# Patient Record
Sex: MALE | Race: WHITE
[De-identification: names, ages, dates, MRNs, and addresses within clinical notes are randomized per-mention and may not be internally consistent; named-entity substitution may affect disease eponyms.]

---

## 2023-08-06 ENCOUNTER — HOSPITAL ENCOUNTER (INPATIENT)
Dept: HOSPITAL 95 - PCU | Age: 88
LOS: 13 days | Discharge: HOME HEALTH | DRG: 291 | End: 2023-08-19
Attending: INTERNAL MEDICINE | Admitting: HOSPITALIST
Payer: MEDICARE

## 2023-08-06 VITALS — DIASTOLIC BLOOD PRESSURE: 53 MMHG | SYSTOLIC BLOOD PRESSURE: 109 MMHG

## 2023-08-06 VITALS — DIASTOLIC BLOOD PRESSURE: 49 MMHG | SYSTOLIC BLOOD PRESSURE: 111 MMHG

## 2023-08-06 VITALS — SYSTOLIC BLOOD PRESSURE: 117 MMHG | DIASTOLIC BLOOD PRESSURE: 81 MMHG

## 2023-08-06 VITALS — WEIGHT: 174.12 LBS | HEIGHT: 71 IN | BODY MASS INDEX: 24.38 KG/M2

## 2023-08-06 VITALS — DIASTOLIC BLOOD PRESSURE: 55 MMHG | SYSTOLIC BLOOD PRESSURE: 127 MMHG

## 2023-08-06 DIAGNOSIS — I50.33: ICD-10-CM

## 2023-08-06 DIAGNOSIS — J44.0: ICD-10-CM

## 2023-08-06 DIAGNOSIS — J96.02: ICD-10-CM

## 2023-08-06 DIAGNOSIS — N20.0: ICD-10-CM

## 2023-08-06 DIAGNOSIS — E11.9: ICD-10-CM

## 2023-08-06 DIAGNOSIS — I11.0: Primary | ICD-10-CM

## 2023-08-06 DIAGNOSIS — Z66: ICD-10-CM

## 2023-08-06 DIAGNOSIS — Z86.711: ICD-10-CM

## 2023-08-06 DIAGNOSIS — I87.2: ICD-10-CM

## 2023-08-06 DIAGNOSIS — D63.8: ICD-10-CM

## 2023-08-06 DIAGNOSIS — L89.152: ICD-10-CM

## 2023-08-06 DIAGNOSIS — E87.29: ICD-10-CM

## 2023-08-06 DIAGNOSIS — E87.0: ICD-10-CM

## 2023-08-06 DIAGNOSIS — N21.0: ICD-10-CM

## 2023-08-06 DIAGNOSIS — J90: ICD-10-CM

## 2023-08-06 DIAGNOSIS — J96.01: ICD-10-CM

## 2023-08-06 DIAGNOSIS — R34: ICD-10-CM

## 2023-08-06 DIAGNOSIS — I95.9: ICD-10-CM

## 2023-08-06 DIAGNOSIS — Z51.5: ICD-10-CM

## 2023-08-06 DIAGNOSIS — N26.1: ICD-10-CM

## 2023-08-06 DIAGNOSIS — Z89.512: ICD-10-CM

## 2023-08-06 DIAGNOSIS — I48.0: ICD-10-CM

## 2023-08-06 DIAGNOSIS — J98.11: ICD-10-CM

## 2023-08-06 LAB — PH BLDV: 7.4 [PH] (ref 7.34–7.37)

## 2023-08-06 PROCEDURE — A9270 NON-COVERED ITEM OR SERVICE: HCPCS

## 2023-08-06 PROCEDURE — P9016 RBC LEUKOCYTES REDUCED: HCPCS

## 2023-08-07 VITALS — SYSTOLIC BLOOD PRESSURE: 97 MMHG | DIASTOLIC BLOOD PRESSURE: 51 MMHG

## 2023-08-07 VITALS — DIASTOLIC BLOOD PRESSURE: 56 MMHG | SYSTOLIC BLOOD PRESSURE: 96 MMHG

## 2023-08-07 VITALS — SYSTOLIC BLOOD PRESSURE: 114 MMHG | DIASTOLIC BLOOD PRESSURE: 57 MMHG

## 2023-08-07 VITALS — DIASTOLIC BLOOD PRESSURE: 49 MMHG | SYSTOLIC BLOOD PRESSURE: 102 MMHG

## 2023-08-07 VITALS — SYSTOLIC BLOOD PRESSURE: 112 MMHG | DIASTOLIC BLOOD PRESSURE: 54 MMHG

## 2023-08-07 VITALS — SYSTOLIC BLOOD PRESSURE: 95 MMHG | DIASTOLIC BLOOD PRESSURE: 74 MMHG

## 2023-08-07 VITALS — DIASTOLIC BLOOD PRESSURE: 57 MMHG | SYSTOLIC BLOOD PRESSURE: 98 MMHG

## 2023-08-07 VITALS — DIASTOLIC BLOOD PRESSURE: 54 MMHG | SYSTOLIC BLOOD PRESSURE: 102 MMHG

## 2023-08-07 VITALS — SYSTOLIC BLOOD PRESSURE: 100 MMHG | DIASTOLIC BLOOD PRESSURE: 51 MMHG

## 2023-08-07 VITALS — DIASTOLIC BLOOD PRESSURE: 49 MMHG | SYSTOLIC BLOOD PRESSURE: 94 MMHG

## 2023-08-07 VITALS — DIASTOLIC BLOOD PRESSURE: 53 MMHG | SYSTOLIC BLOOD PRESSURE: 88 MMHG

## 2023-08-07 VITALS — SYSTOLIC BLOOD PRESSURE: 115 MMHG | DIASTOLIC BLOOD PRESSURE: 61 MMHG

## 2023-08-07 VITALS — SYSTOLIC BLOOD PRESSURE: 99 MMHG | DIASTOLIC BLOOD PRESSURE: 56 MMHG

## 2023-08-07 VITALS — SYSTOLIC BLOOD PRESSURE: 85 MMHG | DIASTOLIC BLOOD PRESSURE: 50 MMHG

## 2023-08-07 VITALS — DIASTOLIC BLOOD PRESSURE: 49 MMHG | SYSTOLIC BLOOD PRESSURE: 79 MMHG

## 2023-08-07 VITALS — SYSTOLIC BLOOD PRESSURE: 90 MMHG | DIASTOLIC BLOOD PRESSURE: 48 MMHG

## 2023-08-07 LAB
ANION GAP SERPL CALCULATED.4IONS-SCNC: 3 MMOL/L (ref 6–16)
BASOPHILS # BLD AUTO: 0.04 K/MM3 (ref 0–0.23)
BASOPHILS NFR BLD AUTO: 1 % (ref 0–2)
BUN SERPL-MCNC: 20 MG/DL (ref 8–24)
CALCIUM SERPL-MCNC: 8.6 MG/DL (ref 8.5–10.1)
CHLORIDE SERPL-SCNC: 105 MMOL/L (ref 98–108)
CO2 SERPL-SCNC: 37 MMOL/L (ref 21–32)
CREAT SERPL-MCNC: 1.05 MG/DL (ref 0.6–1.2)
DEPRECATED RDW RBC AUTO: 53.3 FL (ref 35.1–46.3)
EOSINOPHIL # BLD AUTO: 0.03 K/MM3 (ref 0–0.68)
EOSINOPHIL NFR BLD AUTO: 1 % (ref 0–6)
ERYTHROCYTE [DISTWIDTH] IN BLOOD BY AUTOMATED COUNT: 17.3 % (ref 11.7–14.2)
GLUCOSE SERPL-MCNC: 210 MG/DL (ref 70–99)
GLUCOSE SERPL-MCNC: 81 MG/DL (ref 70–99)
GLUCOSE SERPL-MCNC: 95 MG/DL (ref 70–99)
HCT VFR BLD AUTO: 29.5 % (ref 37–53)
HGB BLD-MCNC: 8.3 G/DL (ref 13.5–17.5)
IMM GRANULOCYTES # BLD AUTO: 0.05 K/MM3 (ref 0–0.1)
IMM GRANULOCYTES NFR BLD AUTO: 1 % (ref 0–1)
LYMPHOCYTES # BLD AUTO: 0.95 K/MM3 (ref 0.84–5.2)
LYMPHOCYTES NFR BLD AUTO: 20 % (ref 21–46)
MCHC RBC AUTO-ENTMCNC: 28.1 G/DL (ref 31.5–36.5)
MCV RBC AUTO: 85 FL (ref 80–100)
MONOCYTES # BLD AUTO: 0.38 K/MM3 (ref 0.16–1.47)
MONOCYTES NFR BLD AUTO: 8 % (ref 4–13)
NEUTROPHILS # BLD AUTO: 3.2 K/MM3 (ref 1.96–9.15)
NEUTROPHILS NFR BLD AUTO: 69 % (ref 41–73)
NRBC # BLD AUTO: 0 K/MM3 (ref 0–0.02)
NRBC BLD AUTO-RTO: 0 /100 WBC (ref 0–0.2)
PH BLDV: 7.42 [PH] (ref 7.34–7.37)
PLATELET # BLD AUTO: 177 K/MM3 (ref 150–400)
POTASSIUM SERPL-SCNC: 4.3 MMOL/L (ref 3.5–5.5)
PROTHROMBIN TIME: 12.4 SEC (ref 9.7–11.5)
SODIUM SERPL-SCNC: 145 MMOL/L (ref 136–145)

## 2023-08-07 NOTE — NUR
UPDATE
PTROVIDER NOTIFIED THAT PT WAS ABLE TO PASS BEDSIDE SWALLOW EVAL, PT PLACED ON
CLEAR LIQUID DIET W ADVANCEMENT AS TOLERATED. PROVIDER NOTIFIED THAT THE PT
HAS HAD VERY MINIMAL URINE OUTPUT THIS SHIFT AND BLADDER SCAN SHOWING 200ML.
ORDER TO BLADDER SCAN PRN AND MONITOR.

## 2023-08-07 NOTE — NUR
UPDATE
 
PT CONTINUES TO HAVE VERY LITTLE URINE OUTPUT. DR. VALERA MADE AWARE
WITH ORDERS FOR 250ML NS BOLUS AND CONTINUE TO MONITOR WITH BLADDER SCANS. NO
OTHER ORDERS AT THIS TIME.

## 2023-08-07 NOTE — NUR
CARE ASSUMPTION
DURING BEDSIDE SHIFT REPORT WITH FRANKLYN RN THE PT IS LYING IN BED AWAKE ON 2L
NC. PT IS CONFUSED ABOUT WHERE HE IS BUT HE CAN TELL ME HIS NAME AND DATE OF
BIRTH. PT DENIES ANY PAIN OR NAUSEA AND IS SPEAKING TO THIS RN W A SOFT BUT
CLEAR VOICE. SPO2 >92% ON 2L NC.

## 2023-08-07 NOTE — NUR
DAY SHIFT SUMMARY
PT HAS REMAINED AXO X2 BUT IS STILL CONFUSED ABOUT WHERE HE IS AND WHY HE IS
HERE. THE PT HAS BEEN DROWSY THIS SHIFT TAKING SHORT NAPS ON AND OFF THROUGH
OUT THE DAY. PT'S BP SOFT THIS SHIFT EVEN AFTER RECIEVING 1L NS BOLUS BUT
REMAINED STABLE W MAP >65. MONITOR SHOWING AFIB 100-120'S W OCCASIONAL PVC'S
THIS SHIFT. PT MAINTAINING SPO2 >92% ON 1-2L NC THIS SHIFT BUT WAS TITRATED
DOWN TO RM AIR THIS AFTERNOON. PT HAS HAD ALMOST NO URINE OUTPUT THIS SHIFT
AND BLADDER SCANS HAVE SHOWN <200ML, PROVIDER NOTIFIED W NO NEW ORDERS GIVEN.
PT HAD ONE LOOSE INCONTINENT BM THIS SHIFT. PT'S WIFE UPDATED ON PT'S
CONDITION AT 1650. PT PASSING BEDSIDE SWALLOW EVAL THIS AFTERNOON AND
TOLERATING CLEAR LIQUIDS. WILL REPORT TO ONCOMING RN.

## 2023-08-07 NOTE — NUR
SHIFT SUMMARY
 
A/Ox2-3, CONFUSED/IMPULSIVE AT TIMES FOR PT HAS FREQUENTLY PULLED OF HIS TELE
LEADS, SPO2 SENSOR, AND HIS OXYGEN. IS COOPERATIVE WITH CARE WITH REDIRECTION,
BUT QUICKLY REVERTS BACK TO PULLING AT LINES. CARDIAC, ~0100 THIS AM PT
CONVERTED FROM SR 70-80'S TO AFIB RHYTHM  IN THE 'S. PT DENIES ANY CP OR
PRESSURE AS OF THIS NOTE. RESPIRATORY, MAINTAINS SPO2 >90% ON HIS BASELINE O2
OF 2L (PER THE SPOUSES REPORT . CONTINUES TO HAVE DIMINISHED LS AS WELL AS
FINE INSPIRATORY CRACKLES IN THE BASES. DENIES ANY SOB OR DYSPNEA AT REST
HOWEVER. GI/, PT HAS BEEN INCONTINENT T/O THE NIGHT, CONDOM CATH PLACED.
CONDOM CATH PATENT AND DRAINING YELLOW URINE TO GRAVITY. NO BM FOR MY SHIFT.
PT IS WHEELCHAIR BOUND AT HOME (PER PT S SPOUSE) DUE TO HX OF LEFT BKA. PT HAS
REMAINED NPO SINCE MD PER POTENTIAL THORACENTESIS THIS AM. ASSESSED PT FOR
RISKS OF ANY IGNITION SOURCES AS WELL AS BEHAVIORS FOR INCREASED RISKS OF FIRE
DANGER. PT EDUCATED ON COMMON SOURCES OF IGNITION AS WELL AS NEED TO KEEP A
SAFE ENVIRONMENT. PT VOICED UNDERSTANDING. NO NEW ORDERS AT THIS TIME, WILL
REPORT TO ONCOMING RN. KVNG STEVEN AS OF THIS NOTE.

## 2023-08-08 VITALS — SYSTOLIC BLOOD PRESSURE: 96 MMHG | DIASTOLIC BLOOD PRESSURE: 43 MMHG

## 2023-08-08 VITALS — DIASTOLIC BLOOD PRESSURE: 53 MMHG | SYSTOLIC BLOOD PRESSURE: 106 MMHG

## 2023-08-08 VITALS — DIASTOLIC BLOOD PRESSURE: 66 MMHG | SYSTOLIC BLOOD PRESSURE: 125 MMHG

## 2023-08-08 VITALS — DIASTOLIC BLOOD PRESSURE: 85 MMHG | SYSTOLIC BLOOD PRESSURE: 102 MMHG

## 2023-08-08 VITALS — DIASTOLIC BLOOD PRESSURE: 36 MMHG | SYSTOLIC BLOOD PRESSURE: 114 MMHG

## 2023-08-08 VITALS — SYSTOLIC BLOOD PRESSURE: 100 MMHG | DIASTOLIC BLOOD PRESSURE: 73 MMHG

## 2023-08-08 VITALS — SYSTOLIC BLOOD PRESSURE: 99 MMHG | DIASTOLIC BLOOD PRESSURE: 57 MMHG

## 2023-08-08 VITALS — DIASTOLIC BLOOD PRESSURE: 61 MMHG | SYSTOLIC BLOOD PRESSURE: 112 MMHG

## 2023-08-08 VITALS — SYSTOLIC BLOOD PRESSURE: 92 MMHG | DIASTOLIC BLOOD PRESSURE: 51 MMHG

## 2023-08-08 VITALS — SYSTOLIC BLOOD PRESSURE: 96 MMHG | DIASTOLIC BLOOD PRESSURE: 47 MMHG

## 2023-08-08 VITALS — DIASTOLIC BLOOD PRESSURE: 54 MMHG | SYSTOLIC BLOOD PRESSURE: 114 MMHG

## 2023-08-08 VITALS — SYSTOLIC BLOOD PRESSURE: 121 MMHG | DIASTOLIC BLOOD PRESSURE: 58 MMHG

## 2023-08-08 VITALS — DIASTOLIC BLOOD PRESSURE: 52 MMHG | SYSTOLIC BLOOD PRESSURE: 112 MMHG

## 2023-08-08 VITALS — SYSTOLIC BLOOD PRESSURE: 113 MMHG | DIASTOLIC BLOOD PRESSURE: 49 MMHG

## 2023-08-08 VITALS — SYSTOLIC BLOOD PRESSURE: 122 MMHG | DIASTOLIC BLOOD PRESSURE: 52 MMHG

## 2023-08-08 VITALS — SYSTOLIC BLOOD PRESSURE: 102 MMHG | DIASTOLIC BLOOD PRESSURE: 55 MMHG

## 2023-08-08 LAB
ALBUMIN SERPL BCP-MCNC: 1.6 G/DL (ref 3.4–5)
ALBUMIN/GLOB SERPL: 0.4 {RATIO} (ref 0.8–1.8)
ALT SERPL W P-5'-P-CCNC: 14 U/L (ref 12–78)
ANION GAP SERPL CALCULATED.4IONS-SCNC: 3 MMOL/L (ref 6–16)
AST SERPL W P-5'-P-CCNC: 15 U/L (ref 12–37)
BASOPHILS # BLD AUTO: 0.02 K/MM3 (ref 0–0.23)
BASOPHILS NFR BLD AUTO: 0 % (ref 0–2)
BILIRUB SERPL-MCNC: 0.3 MG/DL (ref 0.1–1)
BUN SERPL-MCNC: 23 MG/DL (ref 8–24)
CALCIUM SERPL-MCNC: 8.2 MG/DL (ref 8.5–10.1)
CHLORIDE SERPL-SCNC: 106 MMOL/L (ref 98–108)
CO2 SERPL-SCNC: 36 MMOL/L (ref 21–32)
CREAT SERPL-MCNC: 1.1 MG/DL (ref 0.6–1.2)
DEPRECATED RDW RBC AUTO: 52.1 FL (ref 35.1–46.3)
EOSINOPHIL # BLD AUTO: 0.02 K/MM3 (ref 0–0.68)
EOSINOPHIL NFR BLD AUTO: 0 % (ref 0–6)
ERYTHROCYTE [DISTWIDTH] IN BLOOD BY AUTOMATED COUNT: 17.4 % (ref 11.7–14.2)
FERRITIN SERPL-MCNC: 684 NG/ML (ref 26–388)
GLOBULIN SER CALC-MCNC: 3.7 G/DL (ref 2.2–4)
GLUCOSE SERPL-MCNC: 116 MG/DL (ref 70–99)
GLUCOSE SERPL-MCNC: 121 MG/DL (ref 70–99)
HCT VFR BLD AUTO: 27.3 % (ref 37–53)
HGB BLD-MCNC: 8 G/DL (ref 13.5–17.5)
IMM GRANULOCYTES # BLD AUTO: 0.04 K/MM3 (ref 0–0.1)
IMM GRANULOCYTES NFR BLD AUTO: 1 % (ref 0–1)
LDH SPEC-CCNC: 219 U/L
LYMPHOCYTES # BLD AUTO: 1.1 K/MM3 (ref 0.84–5.2)
LYMPHOCYTES NFR BLD AUTO: 22 % (ref 21–46)
LYMPHOCYTES NFR FLD MANUAL: 35 % (ref 0–18)
MCHC RBC AUTO-ENTMCNC: 29.3 G/DL (ref 31.5–36.5)
MCV RBC AUTO: 82 FL (ref 80–100)
MONOCYTES # BLD AUTO: 0.42 K/MM3 (ref 0.16–1.47)
MONOCYTES NFR BLD AUTO: 8 % (ref 4–13)
MONONUC CELLS NFR FLD MANUAL: 18 % (ref 0–50)
NEUTROPHILS # BLD AUTO: 3.49 K/MM3 (ref 1.96–9.15)
NEUTROPHILS NFR BLD AUTO: 69 % (ref 41–73)
NEUTS SEG NFR FLD MANUAL: 47 % (ref 0–25)
NRBC # BLD AUTO: 0 K/MM3 (ref 0–0.02)
NRBC BLD AUTO-RTO: 0 /100 WBC (ref 0–0.2)
PLATELET # BLD AUTO: 174 K/MM3 (ref 150–400)
POTASSIUM SERPL-SCNC: 3.8 MMOL/L (ref 3.5–5.5)
PROT SERPL-MCNC: 5.3 G/DL (ref 6.4–8.2)
RBC # FLD MANUAL: (no result) /MM3 (ref 0–0)
RBC # FLD MANUAL: 0.01 M/MM3 (ref 0–0)
SODIUM SERPL-SCNC: 145 MMOL/L (ref 136–145)
TIBC SERPL-MCNC: 57 UG/DL (ref 250–450)
TOTAL CELLS COUNTED SPEC: 100
WBC # FLD AUTO: 1.41 K/MM3 (ref 0–999)

## 2023-08-08 NOTE — NUR
SHIFT SUMMARY
 
A/Ox2-3,  CONTINUES TO BE CONFUSED/IMPULSIVE AT TIMES FOR PT HAS FREQUENTLY
ATTEMPTED TO PULL OF HIS TELE LEADS, SPO2 SENSOR, AND OR HIS OXYGEN. A 1:1
SITTER AND OR REMOTE MONITORING HAS BEEN UTILIZED IN THE ROOM T/O THE SHIFT.
FOR THE MOST PART, PT IS COOPERATIVE WITH CARE WHEN PROVIDED WITH
REDIRECTION. CARDIAC, REMAINS IN A AFIB RHYTHM   S. WITH NO REPORTS OF
ANY CP OR PRESSURE. RESPIRATORY, MAINTAINS SPO2 >90% ON RA-2L VIA HUMIDIFIED
NC. CONTINUES TO HAVE DIMINISHED LS (ESPECIALLY ON RIGHT SIDE) AS WELL AS FINE
INSPIRATORY CRACKLES IN THE BASES. DENIES ANY SOB OR DYSPNEA AT REST HOWEVER.
GI/, PT HAS BEEN INCONTINENT T/O THE NIGHT OF BOTH URINE AND STOOL. PT
CONTINUES TO HAVE POOR URINE OUTPUT AND HE APPEARS DRY. PROVIDER AWARE WITH
ONE 250ML BOLUS OF NS ORDERED. AN HOUR AFTER BOLUS, PT FINALLY VOIDED CHIKI
COLORED URINE. CONDOM CATH HAS BEEN PATENT AND DRAINING CHIKI URINE TO
GRAVITY. INCONTINENT BM AT THE START OF THE SHIFT. STOOL CONTINUES TO BE
SOFT/RUNNY, BUT BROWN IN COLOR. PT IS WHEELCHAIR BOUND AT HOME. CURRENTLY IS
VERY WEAK AND CEILING LIFT SHOULD BE USED TO HELP AMBULATE PT TO CHAIRS. PT
HAS REMAINED NPO SINCE MDN FOR SCHEDULED THORACENTESIS THIS AM. ASSESSED PT
FOR RISKS OF ANY IGNITION SOURCES AS WELL AS BEHAVIORS FOR INCREASED RISKS OF
FIRE DANGER. PT EDUCATED ON COMMON SOURCES OF IGNITION AS WELL AS NEED TO KEEP
A SAFE ENVIRONMENT. PT VOICED UNDERSTANDING. NO NEW ORDERS AT THIS TIME, WILL
REPORT TO ONCOMING RN. KVNG STEVEN AS OF THIS NOTE.

## 2023-08-08 NOTE — NUR
PT SUMMARY:
 
PT HAD THORACENTESIS DONE TODAY 45OMLS FLUID WAS TAKEN OUT, DRESSING ON RIGHT
MID BACK INTACT, NO HEMATOMA/BLEEDING NOTED. PT BP WAS STILL RUNNING SOFT
90'S-100'S WITH MAP>60'S ORTHOSTATIC BP'S WITH NO CHANGES ONE TIME INFUSION OF
500MLS LR WAS INFUSED SBP STAYED >100 AFTER ALSO DIET WAS ADVANCED TO SOFT
DIET AND PT TOLERATED WELL. HRR REMAINED AFIB CONTROLLED AT 80'S-90'S, SATS
ABOVE 93% ON 1L OF O2, AFEBRILE.  PT ALERT AND ORIENTED X3 STILL HAS SOME
CONFUSION MOSTLY UPON WAKING UP, WILL PULL ON LINES AND TUBINGS, REMOTE
MONITORING STILL ONGOING. PT HAS NO URINE OUTPUT AT LUNCH TIME CALLED PROVIDER
DR CURRAN ORDERED RENAL QIAN THEN BEFORE SHIFT ENDS PT STILL HAS NO URINE IA
CONDOM CATH AND BLADDER SCAN SHOWS 200MLS URINE RETAINED, CALLED DR MCGRATH ABOUT
RESULT OF RENAL QIAN (RENAL AND BLADDER CALCULI) ORDERED TO STRAIGHT CATH PT,
STRAIGHT CATH PERFORMED 200MLS URINE WAS COLLECTED URINE DARK YELLOW IN COLOR
WITH SEDIMENTS. PT HAD 2 LOOSE DARK BROWN BM, BED BATH COMPLETED. PT/OT ABLE
TO WORK WITH THE PT, PT WAS ABLE TO SIT ON THE SIDE OF THE BED PER REPORT,
STILL TO USE CEILING LIFT FOR TRANSFERS. WIFE ELYSIA CALLED AND WAS GIVEN
UPDATE REGARDING PT'S STATUS.  PT RESTING IN BED AT THIS TIME, CALL
LIGHTS IN REACH WILL REPORT TO ONCOMING SHIFT

## 2023-08-09 VITALS — DIASTOLIC BLOOD PRESSURE: 55 MMHG | SYSTOLIC BLOOD PRESSURE: 136 MMHG

## 2023-08-09 VITALS — DIASTOLIC BLOOD PRESSURE: 54 MMHG | SYSTOLIC BLOOD PRESSURE: 115 MMHG

## 2023-08-09 VITALS — SYSTOLIC BLOOD PRESSURE: 94 MMHG | DIASTOLIC BLOOD PRESSURE: 56 MMHG

## 2023-08-09 VITALS — SYSTOLIC BLOOD PRESSURE: 109 MMHG | DIASTOLIC BLOOD PRESSURE: 53 MMHG

## 2023-08-09 VITALS — DIASTOLIC BLOOD PRESSURE: 67 MMHG | SYSTOLIC BLOOD PRESSURE: 109 MMHG

## 2023-08-09 LAB
ALBUMIN SERPL BCP-MCNC: 1.6 G/DL (ref 3.4–5)
ALBUMIN/GLOB SERPL: 0.4 {RATIO} (ref 0.8–1.8)
ALT SERPL W P-5'-P-CCNC: 11 U/L (ref 12–78)
ANION GAP SERPL CALCULATED.4IONS-SCNC: 2 MMOL/L (ref 6–16)
AST SERPL W P-5'-P-CCNC: 14 U/L (ref 12–37)
BASOPHILS # BLD AUTO: 0.02 K/MM3 (ref 0–0.23)
BASOPHILS NFR BLD AUTO: 1 % (ref 0–2)
BILIRUB SERPL-MCNC: 0.4 MG/DL (ref 0.1–1)
BUN SERPL-MCNC: 20 MG/DL (ref 8–24)
CALCIUM SERPL-MCNC: 8.3 MG/DL (ref 8.5–10.1)
CHLORIDE SERPL-SCNC: 105 MMOL/L (ref 98–108)
CO2 SERPL-SCNC: 36 MMOL/L (ref 21–32)
CREAT SERPL-MCNC: 1.04 MG/DL (ref 0.6–1.2)
DEPRECATED RDW RBC AUTO: 54.4 FL (ref 35.1–46.3)
EOSINOPHIL # BLD AUTO: 0.06 K/MM3 (ref 0–0.68)
EOSINOPHIL NFR BLD AUTO: 1 % (ref 0–6)
ERYTHROCYTE [DISTWIDTH] IN BLOOD BY AUTOMATED COUNT: 17.8 % (ref 11.7–14.2)
GLOBULIN SER CALC-MCNC: 3.7 G/DL (ref 2.2–4)
GLUCOSE SERPL-MCNC: 123 MG/DL (ref 70–99)
HCT VFR BLD AUTO: 27.1 % (ref 37–53)
HGB BLD-MCNC: 7.8 G/DL (ref 13.5–17.5)
IMM GRANULOCYTES # BLD AUTO: 0.03 K/MM3 (ref 0–0.1)
IMM GRANULOCYTES NFR BLD AUTO: 1 % (ref 0–1)
LYMPHOCYTES # BLD AUTO: 1.04 K/MM3 (ref 0.84–5.2)
LYMPHOCYTES NFR BLD AUTO: 25 % (ref 21–46)
MCHC RBC AUTO-ENTMCNC: 28.8 G/DL (ref 31.5–36.5)
MCV RBC AUTO: 83 FL (ref 80–100)
MONOCYTES # BLD AUTO: 0.39 K/MM3 (ref 0.16–1.47)
MONOCYTES NFR BLD AUTO: 9 % (ref 4–13)
NEUTROPHILS # BLD AUTO: 2.64 K/MM3 (ref 1.96–9.15)
NEUTROPHILS NFR BLD AUTO: 63 % (ref 41–73)
NRBC # BLD AUTO: 0 K/MM3 (ref 0–0.02)
NRBC BLD AUTO-RTO: 0 /100 WBC (ref 0–0.2)
PLATELET # BLD AUTO: 165 K/MM3 (ref 150–400)
POTASSIUM SERPL-SCNC: 3.6 MMOL/L (ref 3.5–5.5)
PROT SERPL-MCNC: 5.3 G/DL (ref 6.4–8.2)
SODIUM SERPL-SCNC: 143 MMOL/L (ref 136–145)

## 2023-08-09 NOTE — NUR
SHIFT SUMMARY
 
A/Ox2-3,  CONTINUES TO BE CONFUSED/IMPULSIVE AT TIMES FOR PT HAS FREQUENTLY
ATTEMPTED TO PULL OF HIS TELE LEADS, SPO2 SENSOR, AND OR HIS OXYGEN. REMOTE
MONITORING HAS BEEN UTILIZED IN THE ROOM T/O THE SHIFT. FOR THE MOST PART, PT
IS COOPERATIVE WITH CARE WHEN PROVIDED WITH REDIRECTION. CARDIAC, REMAINS IN
AFIB RHYTHM  'S. WITH NO REPORTS OF ANY CP OR PRESSURE. RESPIRATORY,
MAINTAINS SPO2 >90% ON RA-2L VIA HUMIDIFIED NC. LS IMPROVED FROM PREVIOUS
NIGHT. DENIES ANY SOB OR DYSPNEA AT REST HOWEVER. PT UNDERWENT THORACENTESIS
YESTERDAY PM WITH 450ML OF FLUID REMOVED PER DAY SHIFT REPORT. GI/, PT HAS
BEEN INCONTINENT T/O THE NIGHT OF BOTH URINE AND STOOL. PT CONTINUES TO HAVE
POOR URINE OUTPUT AND HE APPEARS DRY. PROVIDER AWARE WITH ONE 500ML BOLUS OF
NS ORDERED. CONTINUES TO NOT HAVE ANY URINE OUTPUT DESPITE BOLUS. MD MADE
AWARE WITH ORDERS TO CONTINUE TO MONITOR. CONTINUES TO HAVE SOFT/LIQUID BROWN
STOOLS. PT IS WHEELCHAIR BOUND AT HOME. CURRENTLY IS VERY WEAK, BUT PT/OT ARE
ON BOARD. PT'S DIET HAS BEEN ADVANCED TO SOFT BITE SIZE. THIS RN ENCOURAGED
INCREASING PO FLUID INTAKE T/O THE SHIFT. ASSESSED PT FOR RISKS OF ANY
IGNITION SOURCES AS WELL AS BEHAVIORS FOR INCREASED RISKS OF FIRE DANGER. PT
EDUCATED ON COMMON SOURCES OF IGNITION AS WELL AS NEED TO KEEP A SAFE
ENVIRONMENT. PT VOICED UNDERSTANDING. NO NEW ORDERS AT THIS TIME, WILL REPORT
TO ONCOMING RN. KVNG STEVEN AS OF THIS NOTE.

## 2023-08-09 NOTE — NUR
UPDATE
 
PT NOTED TO HAVE NO URINE OUTPUT FOR >6HRS. BLADDER SCAN REVEALED ONLY 229ML
IN BLADDER. DR. CÁRDENAS NOTIFIED WITH ORDERS TO ADMINISTER 500ML BOLUS OF
NS AND TO CONTINUE TO MONITOR PT. NO OTHER ORDERS AT THIS TIME.

## 2023-08-09 NOTE — NUR
PT SUMMARY:
 
PT WAS CONFUSED THIS MORNING PULLING ON LINES AND TUBINGS, PULLED TELE OFF
MULTIPLE TIMES REMOTE MONITOR CALLED MULTIPLE TIMES AS WELL, PT STARTED TO
REFUSE CARE WAS REDIRECTED MULTIPLE TIMES DOESNT SEEM TO RETAIN INFORMATION,
REFUSE TO REPLACE BLUE SHIRT ON, WAS YELLING OUT THIS MORNING FOR HELP. PT C/O
5/10 LEFT LEG PAIN (PHANTOM PAIN) MEDICATED WITH DAILY TYLENOL AND WAS
EFFECTIVE, THIS RN ABLE TO TALK TO THE WIFE ON THE PHONE THIS MORNING
REQUESTED TO TALK TO THE PT TO REDIRECT HIM. PALLIATIVE CARE CAME AND SAW PT
TODAY AND SPOKE TO THE WIFE, TO FF-UP CODE STATUS IN THE MORNING. FOLLOWED UP
ECHO RESULT FROM [facility] PER CHARGE RN ECHO WAS NOT DONE AT THEIR FACILITY,
ECHO WAS ORDERED AND GOT DONE TODAY. ALSO CT CHEST AND ABD WAS DONE AND
RESULTED RESIDENTS MADE AWARE OF RESULTS (PLEURAL EFFUSION). PT VITALS HRR
REMAINED AFIB 80-90'S, SBP SOFT 'S, SATS ABOVE 90% ON RA, AFEBRILE. PT
HAD 280MLS OF DARK URINE OUTPUT FOR THE SHIFT VIA CONDOM CATH. PT WAS UP IN
THE RECLINER TODAY TRANSFERED VIA LIFT. PT WITH POOR APPETITE ENSURE WAS
OFFERED PT CONSUMED 2 CARTONS FOR THE SHIFT. PT REPOSITIONED IN BED FOR
COMFORT. REMOTE MONITOR IN USE, BED ALARM ON FOR SAFETY, CALL LIGHTS IN REACH
WILL REPORT TO ONCOMING SHIFT

## 2023-08-09 NOTE — NUR
Case conference: Spoke with pt's wife Cynthia today by phone. She is his primary
decision maker. She initially stated she wants him to remain a "Full Code",
but after our talk she states she will talk with the patient, and re-evaluate.
She reports the pt was seen at [facility] for Left BKA, then to SNF in [facility]. Once d/c'd from SNF, he was promptly taken to [facility]
by his wife for oxygen desaturations. He was admitted with pneumonia, then
transferred here to [facility] for CHF exacerbation, ARF with hypoxia and
hypercapnia. Large pleural effusion was noted on chest x-ray, so R side
thoracentesis was ordered.
 
Cynthia reports both she and her  want him to go to SNF preferrably in
[place]. Plan to visit with pt tomorrow, as he did not appear to be awake
enough for visit this am.

## 2023-08-10 VITALS — SYSTOLIC BLOOD PRESSURE: 119 MMHG | DIASTOLIC BLOOD PRESSURE: 60 MMHG

## 2023-08-10 VITALS — DIASTOLIC BLOOD PRESSURE: 55 MMHG | SYSTOLIC BLOOD PRESSURE: 114 MMHG

## 2023-08-10 VITALS — SYSTOLIC BLOOD PRESSURE: 116 MMHG | DIASTOLIC BLOOD PRESSURE: 48 MMHG

## 2023-08-10 VITALS — DIASTOLIC BLOOD PRESSURE: 53 MMHG | SYSTOLIC BLOOD PRESSURE: 116 MMHG

## 2023-08-10 VITALS — DIASTOLIC BLOOD PRESSURE: 81 MMHG | SYSTOLIC BLOOD PRESSURE: 116 MMHG

## 2023-08-10 LAB
ALBUMIN SERPL BCP-MCNC: 1.6 G/DL (ref 3.4–5)
ALBUMIN/GLOB SERPL: 0.4 {RATIO} (ref 0.8–1.8)
ALT SERPL W P-5'-P-CCNC: 10 U/L (ref 12–78)
ANION GAP SERPL CALCULATED.4IONS-SCNC: 2 MMOL/L (ref 6–16)
AST SERPL W P-5'-P-CCNC: 10 U/L (ref 12–37)
BASOPHILS # BLD AUTO: 0.03 K/MM3 (ref 0–0.23)
BASOPHILS NFR BLD AUTO: 1 % (ref 0–2)
BILIRUB SERPL-MCNC: 0.3 MG/DL (ref 0.1–1)
BUN SERPL-MCNC: 20 MG/DL (ref 8–24)
CALCIUM SERPL-MCNC: 8.1 MG/DL (ref 8.5–10.1)
CHLORIDE SERPL-SCNC: 105 MMOL/L (ref 98–108)
CO2 SERPL-SCNC: 36 MMOL/L (ref 21–32)
CREAT SERPL-MCNC: 1.07 MG/DL (ref 0.6–1.2)
DEPRECATED RDW RBC AUTO: 53 FL (ref 35.1–46.3)
EOSINOPHIL # BLD AUTO: 0.04 K/MM3 (ref 0–0.68)
EOSINOPHIL NFR BLD AUTO: 1 % (ref 0–6)
ERYTHROCYTE [DISTWIDTH] IN BLOOD BY AUTOMATED COUNT: 17.9 % (ref 11.7–14.2)
GLOBULIN SER CALC-MCNC: 3.7 G/DL (ref 2.2–4)
GLUCOSE SERPL-MCNC: 150 MG/DL (ref 70–99)
HCT VFR BLD AUTO: 24.8 % (ref 37–53)
HGB BLD-MCNC: 7.4 G/DL (ref 13.5–17.5)
IMM GRANULOCYTES # BLD AUTO: 0.03 K/MM3 (ref 0–0.1)
IMM GRANULOCYTES NFR BLD AUTO: 1 % (ref 0–1)
LYMPHOCYTES # BLD AUTO: 1.04 K/MM3 (ref 0.84–5.2)
LYMPHOCYTES NFR BLD AUTO: 26 % (ref 21–46)
MCHC RBC AUTO-ENTMCNC: 29.8 G/DL (ref 31.5–36.5)
MCV RBC AUTO: 82 FL (ref 80–100)
MONOCYTES # BLD AUTO: 0.33 K/MM3 (ref 0.16–1.47)
MONOCYTES NFR BLD AUTO: 8 % (ref 4–13)
NEUTROPHILS # BLD AUTO: 2.57 K/MM3 (ref 1.96–9.15)
NEUTROPHILS NFR BLD AUTO: 64 % (ref 41–73)
NRBC # BLD AUTO: 0 K/MM3 (ref 0–0.02)
NRBC BLD AUTO-RTO: 0 /100 WBC (ref 0–0.2)
PLATELET # BLD AUTO: 147 K/MM3 (ref 150–400)
POTASSIUM SERPL-SCNC: 3.9 MMOL/L (ref 3.5–5.5)
PROT SERPL-MCNC: 5.3 G/DL (ref 6.4–8.2)
SODIUM SERPL-SCNC: 143 MMOL/L (ref 136–145)

## 2023-08-10 NOTE — NUR
SHIFT SUMMARY
LETHARGIC BUT WAKES TO VERBAL STIMULI. WEAK AND SOFT SPOKEN. SLEEPS MOST OF
DAY. VSS, O2 AT 1L VIA NC. Q2 HR TURN. INC OF BLADDER AND BOWEL IN ATTENDS.
CONDOM CATH IN PLACE, LOW URINE OUTPUT. BLADDER SCAN REVEALED MILD RETENTION
-250 MLS. ENCOURAGING PO INTAKE OF FLUID. WATER, ICE TEA, AND MILK
OFFERED. WORKED WITH OT, BED EXERCISES. ABLE TO EAT HIS MEALS ON HIS OWN.
FORGETFUL, REMOTE MONITOR DISCONTINUED. MED STATUS. PLAN IS TO DC TO SNF ON
[facility] WHERE HE LIVES. SPOKE WITH SPOUSE ON THE PHONE TODAY AND GAVE UPDATE.

## 2023-08-10 NOTE — NUR
SHIFT SUMMARY
PATIENT ALERT AND ORINETED X3. HAD NO COMPLAINTS OF PAIN OR SHORTNESS OF
BREATH. PATIENT PLACED ON 1 LITER O2 FOR SLEEP TO HELP MAINTAIN OXYGEN >90%.
VITAL SIGNS STABLE, AFIB ON TELE. NO ACUTE ISSUES NOTED OVERNIGHT. PATIENT
ASSESSED FOR IGNITION RISK AND EDUCATED ON FIRE SAFETY IN THE HOSPITAL. WILL
CONTINUE TO MONITOR. CALL LIGHT WITHIN REACH.

## 2023-08-11 VITALS — DIASTOLIC BLOOD PRESSURE: 62 MMHG | SYSTOLIC BLOOD PRESSURE: 113 MMHG

## 2023-08-11 VITALS — DIASTOLIC BLOOD PRESSURE: 57 MMHG | SYSTOLIC BLOOD PRESSURE: 110 MMHG

## 2023-08-11 VITALS — SYSTOLIC BLOOD PRESSURE: 121 MMHG | DIASTOLIC BLOOD PRESSURE: 59 MMHG

## 2023-08-11 VITALS — DIASTOLIC BLOOD PRESSURE: 58 MMHG | SYSTOLIC BLOOD PRESSURE: 111 MMHG

## 2023-08-11 VITALS — DIASTOLIC BLOOD PRESSURE: 78 MMHG | SYSTOLIC BLOOD PRESSURE: 120 MMHG

## 2023-08-11 VITALS — SYSTOLIC BLOOD PRESSURE: 104 MMHG | DIASTOLIC BLOOD PRESSURE: 51 MMHG

## 2023-08-11 VITALS — DIASTOLIC BLOOD PRESSURE: 46 MMHG | SYSTOLIC BLOOD PRESSURE: 121 MMHG

## 2023-08-11 VITALS — SYSTOLIC BLOOD PRESSURE: 100 MMHG | DIASTOLIC BLOOD PRESSURE: 56 MMHG

## 2023-08-11 VITALS — DIASTOLIC BLOOD PRESSURE: 53 MMHG | SYSTOLIC BLOOD PRESSURE: 99 MMHG

## 2023-08-11 VITALS — DIASTOLIC BLOOD PRESSURE: 56 MMHG | SYSTOLIC BLOOD PRESSURE: 96 MMHG

## 2023-08-11 LAB
ALBUMIN SERPL BCP-MCNC: 1.6 G/DL (ref 3.4–5)
ALBUMIN/GLOB SERPL: 0.4 {RATIO} (ref 0.8–1.8)
ALT SERPL W P-5'-P-CCNC: 11 U/L (ref 12–78)
ANION GAP SERPL CALCULATED.4IONS-SCNC: 3 MMOL/L (ref 6–16)
AST SERPL W P-5'-P-CCNC: 13 U/L (ref 12–37)
BASOPHILS # BLD AUTO: 0.02 K/MM3 (ref 0–0.23)
BASOPHILS NFR BLD AUTO: 1 % (ref 0–2)
BILIRUB SERPL-MCNC: 0.4 MG/DL (ref 0.1–1)
BUN SERPL-MCNC: 21 MG/DL (ref 8–24)
CALCIUM SERPL-MCNC: 8.1 MG/DL (ref 8.5–10.1)
CHLORIDE SERPL-SCNC: 105 MMOL/L (ref 98–108)
CO2 SERPL-SCNC: 33 MMOL/L (ref 21–32)
CREAT SERPL-MCNC: 1.14 MG/DL (ref 0.6–1.2)
DEPRECATED RDW RBC AUTO: 55 FL (ref 35.1–46.3)
EOSINOPHIL # BLD AUTO: 0.05 K/MM3 (ref 0–0.68)
EOSINOPHIL NFR BLD AUTO: 1 % (ref 0–6)
ERYTHROCYTE [DISTWIDTH] IN BLOOD BY AUTOMATED COUNT: 18.1 % (ref 11.7–14.2)
GLOBULIN SER CALC-MCNC: 3.6 G/DL (ref 2.2–4)
GLUCOSE SERPL-MCNC: 125 MG/DL (ref 70–99)
HCT VFR BLD AUTO: 24.5 % (ref 37–53)
HCT VFR BLD AUTO: 30 % (ref 37–53)
HGB BLD-MCNC: 7.2 G/DL (ref 13.5–17.5)
HGB BLD-MCNC: 8.7 G/DL (ref 13.5–17.5)
IMM GRANULOCYTES # BLD AUTO: 0.02 K/MM3 (ref 0–0.1)
IMM GRANULOCYTES NFR BLD AUTO: 1 % (ref 0–1)
LYMPHOCYTES # BLD AUTO: 0.96 K/MM3 (ref 0.84–5.2)
LYMPHOCYTES NFR BLD AUTO: 22 % (ref 21–46)
MCHC RBC AUTO-ENTMCNC: 29.4 G/DL (ref 31.5–36.5)
MCV RBC AUTO: 83 FL (ref 80–100)
MONOCYTES # BLD AUTO: 0.36 K/MM3 (ref 0.16–1.47)
MONOCYTES NFR BLD AUTO: 8 % (ref 4–13)
NEUTROPHILS # BLD AUTO: 3 K/MM3 (ref 1.96–9.15)
NEUTROPHILS NFR BLD AUTO: 68 % (ref 41–73)
NRBC # BLD AUTO: 0 K/MM3 (ref 0–0.02)
NRBC BLD AUTO-RTO: 0 /100 WBC (ref 0–0.2)
PLATELET # BLD AUTO: 140 K/MM3 (ref 150–400)
POTASSIUM SERPL-SCNC: 4 MMOL/L (ref 3.5–5.5)
PROT SERPL-MCNC: 5.2 G/DL (ref 6.4–8.2)
SODIUM SERPL-SCNC: 141 MMOL/L (ref 136–145)

## 2023-08-11 PROCEDURE — 30233N1 TRANSFUSION OF NONAUTOLOGOUS RED BLOOD CELLS INTO PERIPHERAL VEIN, PERCUTANEOUS APPROACH: ICD-10-PCS | Performed by: INTERNAL MEDICINE

## 2023-08-11 NOTE — NUR
BLOOD TRANSFUSION STARTED AT 1349. VSS. PATIENT'S RESPIRATORY RATE IS FAST AT
34/MIN AND SHALLOW. HIS O2 SATURATION ON RA IS 92%. PATIENT IS ENCOURAGED TO
DRINK WATER. POOR URINE OUTPUT. PATIENT IS ABLE TO HOLD CUP HIMSELF.

## 2023-08-11 NOTE — NUR
EOS NOTE:
 
MOSTLY UNEVENTFUL NIGHT FOR PATIENT. PATIENT IS PLEASANTLY CONFUSED AND
CONTINUALLY PULLS OF TELEMETRY DESPITE FREQUENTLY REORIENTING PATIENT TO
SURROUNDINGS AND PURPOSE OF TELEMETRY STICKERS TO CHEST. OCCASIONALLY NEEDED
TO BE PLACED ON 1L NC. MAINTAINED A.FIB 70-80'S. CONDOM CATH IN PLACE, STILL
LOW URINE OUTPUT. MD AWARE.

## 2023-08-11 NOTE — NUR
PATIENT IS SITTIN UP IN THE RECLINER, RECEIVING BLOOD AT THIS TIME. NO C/O OF
SOB. LS ARE CLEAR AND DIMINISHED ON THE RIGHT SIDE. ON RA WITH O2 SATS OF 91%.
PATIENT IS ENCOURAGED TO DRINK WATER. CONDOM CATHETER IS IN PLACE. WILL
CONTINUE TO MONITOR

## 2023-08-11 NOTE — NUR
PATIENT WAS TRANSFERRED TO THE RECLINER THIS MORNING BY OT AND RN. HE FED
HIMSELF LUNCH. MORE AWAKE THIS AFTERNOON. WAITING TO INFUSE [facility]. UPDATE
CALLED AND GIVEN TO THE PATIENT'S WIFE.

## 2023-08-11 NOTE — NUR
PATIENT IS ALERT AND ORIENTED AND COOPERATIVE WITH CARE THIS AFTERNOON. HE IS
MORE AWAKE AND WILLING TO COMMUNICATE WITH STAFF COMPARED TO THIS MORNING WHEN
IT SEEMED HE WAS SLEEPY AND DISORIENTED. THE UNIT OF [facility] HAS TRANSFUSED AND A
FOLLOW UP H/H HAS BEEN ORDERED. THE PATIENT HAD AN INCONTINENT BM THIS
AFTERNOON, ATTENDS CHANGED. CONDOM CATH IN PLACE, POOR URINE OUTPUT, PATIENT
ENCOURAGED TO DRINK FLUIDS. STAGE 2 PRESSURE ULCER ON COCCYX, MEPILEX IN PLACE
AND CHANGED THIS SHIFT. PATIENT ON 1L O2 VIA TO INCREASE HIS OXYGEN SATURATION
FROM 86-87% TO 92%. NO COMPLAINTS OF PAIN. PATIENT HAS ASKED ABOUT DISCHARGE
TO A SNF AND WHEN THAT WILL BE. TUNDE WAS ABLE TO TALK TO HIS WIFE OVER THE
PHONE EARLIER TODAY. WILL CONTINUE TO MONITOR

## 2023-08-11 NOTE — NUR
PATIENT IS ALERT AND ORIENTED. HE IS SOFT SPOKEN. 175 ML URINE DRAINED FROM
CONDOM CATHETER AT 0800. DR. SAUNDERS SAW THE PATIENT THIS MORNING. THE PATIENT
NODS HIS HEAD YES OR NO MORE OFTEN THAN REPLYING VERBALLY. PATIENT STATES HE
IS  AND LIVES AT HOME WITH HIS WIFE AND THAT HE HAS A STEP DAUGHTER
THAT IS HANDICAP. PATIENT IS ON RA AT THIS TIME, HE DOES DESAT WHILE LAYING IN
BED TO 86% BUT RECOVERS QUICKLY WITHOUT SUPPLEMENTAL OXYGEN. PATIENT IS
SITTING UP IN BED FOR BREAKFAST BUT HAS NOT TAKEN A BITE ON HIS OWN YET, HE
STATES THAT HE CAN FEED HIMSELF. WILL CONTINUE TO MONITOR

## 2023-08-12 VITALS — DIASTOLIC BLOOD PRESSURE: 54 MMHG | SYSTOLIC BLOOD PRESSURE: 122 MMHG

## 2023-08-12 VITALS — SYSTOLIC BLOOD PRESSURE: 94 MMHG | DIASTOLIC BLOOD PRESSURE: 67 MMHG

## 2023-08-12 VITALS — DIASTOLIC BLOOD PRESSURE: 53 MMHG | SYSTOLIC BLOOD PRESSURE: 120 MMHG

## 2023-08-12 VITALS — SYSTOLIC BLOOD PRESSURE: 117 MMHG | DIASTOLIC BLOOD PRESSURE: 53 MMHG

## 2023-08-12 LAB
ALBUMIN SERPL BCP-MCNC: 1.7 G/DL (ref 3.4–5)
ALBUMIN/GLOB SERPL: 0.5 {RATIO} (ref 0.8–1.8)
ALT SERPL W P-5'-P-CCNC: 10 U/L (ref 12–78)
ANION GAP SERPL CALCULATED.4IONS-SCNC: 3 MMOL/L (ref 6–16)
AST SERPL W P-5'-P-CCNC: 15 U/L (ref 12–37)
BASOPHILS # BLD AUTO: 0.04 K/MM3 (ref 0–0.23)
BASOPHILS NFR BLD AUTO: 1 % (ref 0–2)
BILIRUB SERPL-MCNC: 0.5 MG/DL (ref 0.1–1)
BUN SERPL-MCNC: 21 MG/DL (ref 8–24)
CALCIUM SERPL-MCNC: 7.9 MG/DL (ref 8.5–10.1)
CHLORIDE SERPL-SCNC: 104 MMOL/L (ref 98–108)
CO2 SERPL-SCNC: 33 MMOL/L (ref 21–32)
CREAT SERPL-MCNC: 1.11 MG/DL (ref 0.6–1.2)
DEPRECATED RDW RBC AUTO: 53.5 FL (ref 35.1–46.3)
EOSINOPHIL # BLD AUTO: 0.04 K/MM3 (ref 0–0.68)
EOSINOPHIL NFR BLD AUTO: 1 % (ref 0–6)
ERYTHROCYTE [DISTWIDTH] IN BLOOD BY AUTOMATED COUNT: 18 % (ref 11.7–14.2)
GLOBULIN SER CALC-MCNC: 3.6 G/DL (ref 2.2–4)
GLUCOSE SERPL-MCNC: 123 MG/DL (ref 70–99)
HCT VFR BLD AUTO: 29 % (ref 37–53)
HGB BLD-MCNC: 8.6 G/DL (ref 13.5–17.5)
IMM GRANULOCYTES # BLD AUTO: 0.03 K/MM3 (ref 0–0.1)
IMM GRANULOCYTES NFR BLD AUTO: 1 % (ref 0–1)
LYMPHOCYTES # BLD AUTO: 0.85 K/MM3 (ref 0.84–5.2)
LYMPHOCYTES NFR BLD AUTO: 18 % (ref 21–46)
MCHC RBC AUTO-ENTMCNC: 29.7 G/DL (ref 31.5–36.5)
MCV RBC AUTO: 82 FL (ref 80–100)
MONOCYTES # BLD AUTO: 0.41 K/MM3 (ref 0.16–1.47)
MONOCYTES NFR BLD AUTO: 9 % (ref 4–13)
NEUTROPHILS # BLD AUTO: 3.43 K/MM3 (ref 1.96–9.15)
NEUTROPHILS NFR BLD AUTO: 72 % (ref 41–73)
NRBC # BLD AUTO: 0 K/MM3 (ref 0–0.02)
NRBC BLD AUTO-RTO: 0 /100 WBC (ref 0–0.2)
PLATELET # BLD AUTO: 128 K/MM3 (ref 150–400)
POTASSIUM SERPL-SCNC: 4.1 MMOL/L (ref 3.5–5.5)
PROT SERPL-MCNC: 5.3 G/DL (ref 6.4–8.2)
SODIUM SERPL-SCNC: 140 MMOL/L (ref 136–145)

## 2023-08-12 NOTE — NUR
SHIFT SUMMARY
87 YR M ADMITTED FOR RESPIRATORY FAILURE. FULL CODE. NO ACUTE CHANGES THIS
SHIFT. PT WAS TRANSFERED FROM PCU AND HAD SLEPT FOR MOST OF THE TIME HE HAS
BEEN ON THIS UNIT. WHEN HE WOKE HE WAS VERY CONFUSED AND ASKED WHERE HE WAS
AND WHY. HE SPOKE IN A VOICE SO SOFT IT WAS HARD TO HEAR/UNDERSTAND HIM. NO
C/O PAIN OR DISCOMFORT. PT IS CURRENTLY IN CONTACT PRECAUTIOND FOR HX OF
C-DIF.

## 2023-08-12 NOTE — NUR
SHIFT SUMMARY
 
PATIENT VERY WEAK AND DECONDITIONED.  PATIENT EMOTIONAL THIS MORNING AND
APOLOGIZING FOR BEING A BURDEN TO HIS FAMILY.  PATIENT TEARFUL AND TALKING
ABOUT AN EVENT LAST EVENING WITH STAFF.  PATIENT CONFUSED AT TIMES BUT
COOPERATIVE.  RR CONTINUE TO BE AROUND 30.  CONTINUE TO ENCOURAGE PATIENT TO
DEEP BREATH AND COUGH.  EDUCATIONED FAMILY ON THE IMPORTANCE TO PROTECT AIRWAY
AND MOBILIZE SECRETIONS.

## 2023-08-13 VITALS — DIASTOLIC BLOOD PRESSURE: 73 MMHG | SYSTOLIC BLOOD PRESSURE: 112 MMHG

## 2023-08-13 VITALS — DIASTOLIC BLOOD PRESSURE: 61 MMHG | SYSTOLIC BLOOD PRESSURE: 133 MMHG

## 2023-08-13 VITALS — DIASTOLIC BLOOD PRESSURE: 54 MMHG | SYSTOLIC BLOOD PRESSURE: 99 MMHG

## 2023-08-13 VITALS — SYSTOLIC BLOOD PRESSURE: 128 MMHG | DIASTOLIC BLOOD PRESSURE: 58 MMHG

## 2023-08-13 LAB
ALBUMIN SERPL BCP-MCNC: 1.6 G/DL (ref 3.4–5)
ALBUMIN/GLOB SERPL: 0.4 {RATIO} (ref 0.8–1.8)
ALT SERPL W P-5'-P-CCNC: 11 U/L (ref 12–78)
ANION GAP SERPL CALCULATED.4IONS-SCNC: 3 MMOL/L (ref 6–16)
AST SERPL W P-5'-P-CCNC: 12 U/L (ref 12–37)
BASOPHILS # BLD AUTO: 0.03 K/MM3 (ref 0–0.23)
BASOPHILS NFR BLD AUTO: 1 % (ref 0–2)
BILIRUB SERPL-MCNC: 0.6 MG/DL (ref 0.1–1)
BUN SERPL-MCNC: 22 MG/DL (ref 8–24)
CALCIUM SERPL-MCNC: 7.9 MG/DL (ref 8.5–10.1)
CHLORIDE SERPL-SCNC: 103 MMOL/L (ref 98–108)
CO2 SERPL-SCNC: 33 MMOL/L (ref 21–32)
CREAT SERPL-MCNC: 1.06 MG/DL (ref 0.6–1.2)
DEPRECATED RDW RBC AUTO: 53.7 FL (ref 35.1–46.3)
EOSINOPHIL # BLD AUTO: 0.03 K/MM3 (ref 0–0.68)
EOSINOPHIL NFR BLD AUTO: 1 % (ref 0–6)
ERYTHROCYTE [DISTWIDTH] IN BLOOD BY AUTOMATED COUNT: 18.3 % (ref 11.7–14.2)
GLOBULIN SER CALC-MCNC: 3.8 G/DL (ref 2.2–4)
GLUCOSE SERPL-MCNC: 116 MG/DL (ref 70–99)
HCT VFR BLD AUTO: 27.4 % (ref 37–53)
HGB BLD-MCNC: 8.2 G/DL (ref 13.5–17.5)
IMM GRANULOCYTES # BLD AUTO: 0.01 K/MM3 (ref 0–0.1)
IMM GRANULOCYTES NFR BLD AUTO: 0 % (ref 0–1)
LYMPHOCYTES # BLD AUTO: 0.82 K/MM3 (ref 0.84–5.2)
LYMPHOCYTES NFR BLD AUTO: 19 % (ref 21–46)
MCHC RBC AUTO-ENTMCNC: 29.9 G/DL (ref 31.5–36.5)
MCV RBC AUTO: 82 FL (ref 80–100)
MONOCYTES # BLD AUTO: 0.39 K/MM3 (ref 0.16–1.47)
MONOCYTES NFR BLD AUTO: 9 % (ref 4–13)
NEUTROPHILS # BLD AUTO: 2.95 K/MM3 (ref 1.96–9.15)
NEUTROPHILS NFR BLD AUTO: 70 % (ref 41–73)
NRBC # BLD AUTO: 0 K/MM3 (ref 0–0.02)
NRBC BLD AUTO-RTO: 0 /100 WBC (ref 0–0.2)
PLATELET # BLD AUTO: 126 K/MM3 (ref 150–400)
POTASSIUM SERPL-SCNC: 3.9 MMOL/L (ref 3.5–5.5)
PROT SERPL-MCNC: 5.4 G/DL (ref 6.4–8.2)
SODIUM SERPL-SCNC: 139 MMOL/L (ref 136–145)

## 2023-08-13 NOTE — NUR
SHIFT SUMMARY
87 YR M ADMITTED ON 8/6/23 FOR RESPIRATOR FAILURE. FULL CODE. NO ACUTE CHANGES
THIS SHIFT. PT IS VERY SOFT SPOKEN BUT DID NOT SPEAK OR COMMUNICATE MUCH THIS
SHIFT. HE WAS COOPERATIVE WITH MEDS THEN FELL ASLEEP AND APPEARS TO HAVE SLEPT
FOR MOST OF THIS SHIFT. BED I LOW POSITION WITH ALARM ON AND CALL LIGHT WITHIN
REACH.

## 2023-08-13 NOTE — NUR
PHONE CALL WITH WIFE
 
WIFE CALLED TO SPEAK TO PATIENT.  WIFE UPSET THAT SHE COULD NOT HEAR HIM ON
THE PHONE.  PATIENT EXHAUSTED AND EASILY DRIFTING BACK TO SLEEP WHILE TRYING
TO TALK TO WIFE.  WIFE DEMANDING TO KNOW WHAT WAS WRONG WITH PATIENT AND
DEMANDING US TO GET HIM UP OUT OF BED OR DANGLING AT BEDSIDE.  WIFE YELLING AT
NURSE AND STATING THAT IT WAS OUR FAULT PATIENT WAS LIKE THIS AND WE WERE NOT
TAKING CARE OF HIM.  WIFE STATES THAT SHE WILL BE SPEAKING TO MANY DOCTORS AND
DEMANDING TO KNOW WHAT IS WRONG WITH HIM.  INFORMED WIFE THAT HE MAY HAVE A
LITTLE FAILURE TO THRIVE BECAUSE HE DOES NOT WANT TO EAT AND IS NOT SAFE TO
TRY TO GET PATIENT TO BEDSIDE IF HE IS NOT AWAKE.  TOLD WIFE THAT I WAS NOT
GOING TO TOLERATE HER ABUSIVE CONVERSATION AND HUNG UP THE PHONE.

## 2023-08-13 NOTE — NUR
SHIFT SUMMARY
 
PATIENT SLEEPY MOST OF THE DAY.  POOR APPETITE-ONLY EATING BITES.  VOICE AT A
WISPER.  CONTINUE TO ENCOURAGE PATIENT TO COUGH AND DEEP BREATH. PATIENT
ATTEMPTED TO GET OOB X1 WITHOUT HELP.  PATIENT INCONTINENT STOOL AND URINE
NEEDING FREQUENT ATTENDS CHANGES.  GROIN EXCORIATED.  BARRIER CREAM APPLIED TO
AREA AS NEEDED WITH ATTENDS CHANGES.  SKIN CARE PROVIDED TO R LOWER EXTREMETY
BECAUSE OF SCALEY DRY SKIN. FAMILY CALLED AND ATTEMPTED TO SPEAK TO PATIENT X2
DURING SHIFT.

## 2023-08-14 VITALS — DIASTOLIC BLOOD PRESSURE: 54 MMHG | SYSTOLIC BLOOD PRESSURE: 122 MMHG

## 2023-08-14 VITALS — SYSTOLIC BLOOD PRESSURE: 118 MMHG | DIASTOLIC BLOOD PRESSURE: 52 MMHG

## 2023-08-14 VITALS — SYSTOLIC BLOOD PRESSURE: 116 MMHG | DIASTOLIC BLOOD PRESSURE: 56 MMHG

## 2023-08-14 VITALS — DIASTOLIC BLOOD PRESSURE: 59 MMHG | SYSTOLIC BLOOD PRESSURE: 116 MMHG

## 2023-08-14 LAB
BASOPHILS # BLD AUTO: 0.02 K/MM3 (ref 0–0.23)
BASOPHILS NFR BLD AUTO: 0 % (ref 0–2)
DEPRECATED RDW RBC AUTO: 58 FL (ref 35.1–46.3)
EOSINOPHIL # BLD AUTO: 0.05 K/MM3 (ref 0–0.68)
EOSINOPHIL NFR BLD AUTO: 1 % (ref 0–6)
ERYTHROCYTE [DISTWIDTH] IN BLOOD BY AUTOMATED COUNT: 18.2 % (ref 11.7–14.2)
HCT VFR BLD AUTO: 28.4 % (ref 37–53)
HGB BLD-MCNC: 8 G/DL (ref 13.5–17.5)
IMM GRANULOCYTES # BLD AUTO: 0.03 K/MM3 (ref 0–0.1)
IMM GRANULOCYTES NFR BLD AUTO: 1 % (ref 0–1)
LYMPHOCYTES # BLD AUTO: 0.98 K/MM3 (ref 0.84–5.2)
LYMPHOCYTES NFR BLD AUTO: 20 % (ref 21–46)
MCHC RBC AUTO-ENTMCNC: 28.2 G/DL (ref 31.5–36.5)
MCV RBC AUTO: 87 FL (ref 80–100)
MONOCYTES # BLD AUTO: 0.5 K/MM3 (ref 0.16–1.47)
MONOCYTES NFR BLD AUTO: 10 % (ref 4–13)
NEUTROPHILS # BLD AUTO: 3.26 K/MM3 (ref 1.96–9.15)
NEUTROPHILS NFR BLD AUTO: 68 % (ref 41–73)
NRBC # BLD AUTO: 0 K/MM3 (ref 0–0.02)
NRBC BLD AUTO-RTO: 0 /100 WBC (ref 0–0.2)
PLATELET # BLD AUTO: 124 K/MM3 (ref 150–400)

## 2023-08-14 NOTE — NUR
SHIFT SUMMARY
A/OX2, SOFT SPEECH, GENARLIZED WEAKNESS. 2-3P MAX ASSIST FOR TRANSFERS. DENIES
CHEST PAIN/PRESSURE. SPO2 >92% ON 2L NC. PG TO EMELY. SLEPT T/O THE NIGHT. VSS,
NO ACUTE CHANGES AT THIS TIME. BED IN LOWEST POSITION WITH CALL LIGHT IN
REACH. WILL CONTINUE TO MONTIOR AND REPORT TO ONCOMING RN.

## 2023-08-14 NOTE — NUR
resp rate 32
Pt laying back resting quietly. rresp. shallow and unalbored. lung fields
dimished. Called Dr Fine. COntinue POC

## 2023-08-14 NOTE — NUR
SHIFT NOTE
PT ALERT. RIGHT LUNG FIELD DIMINISHED T/O. SAT 90% ON 2L. INCREASED OXYGEN TO
4L N/C. PT FEELING BETTER. HE WAS JOKING AND SMILING THIS EVENING. POOR
APPETITE. LASIX GIVEN PER ORDER. LEFT UE POWER GLIDE CD&I. PT CALLED FOR
URINAL TODAY. VOIDED MULTIPLE TIMES OF CHIKI ,URINE. DENIED PAIN. MILD SOB.
VOICE QUALITY IMPROVED THIS EVENING. HE TRIES TO HELP TURN HIM. USED THE [facility]
TO TRANSFER HIM TO THE RECLINER FOR THE AFTERNOON. NO NAPPING TODAY. MULTIPLE
PHONE CALLS FROM FAMILY TODAY. RELATED FAMILY REQUEST TO TALK WITH DR TO DR CURRAN. CONTINUE POC.

## 2023-08-15 VITALS — DIASTOLIC BLOOD PRESSURE: 60 MMHG | SYSTOLIC BLOOD PRESSURE: 104 MMHG

## 2023-08-15 VITALS — DIASTOLIC BLOOD PRESSURE: 52 MMHG | SYSTOLIC BLOOD PRESSURE: 125 MMHG

## 2023-08-15 VITALS — DIASTOLIC BLOOD PRESSURE: 54 MMHG | SYSTOLIC BLOOD PRESSURE: 119 MMHG

## 2023-08-15 VITALS — SYSTOLIC BLOOD PRESSURE: 104 MMHG | DIASTOLIC BLOOD PRESSURE: 50 MMHG

## 2023-08-15 LAB
ALBUMIN SERPL BCP-MCNC: 1.7 G/DL (ref 3.4–5)
ALBUMIN/GLOB SERPL: 0.5 {RATIO} (ref 0.8–1.8)
ALT SERPL W P-5'-P-CCNC: 8 U/L (ref 12–78)
ANION GAP SERPL CALCULATED.4IONS-SCNC: 1 MMOL/L (ref 6–16)
AST SERPL W P-5'-P-CCNC: 10 U/L (ref 12–37)
BASOPHILS # BLD AUTO: 0.02 K/MM3 (ref 0–0.23)
BASOPHILS NFR BLD AUTO: 1 % (ref 0–2)
BILIRUB SERPL-MCNC: 0.5 MG/DL (ref 0.1–1)
BUN SERPL-MCNC: 22 MG/DL (ref 8–24)
CALCIUM SERPL-MCNC: 8.2 MG/DL (ref 8.5–10.1)
CHLORIDE SERPL-SCNC: 105 MMOL/L (ref 98–108)
CO2 SERPL-SCNC: 37 MMOL/L (ref 21–32)
CREAT SERPL-MCNC: 1.14 MG/DL (ref 0.6–1.2)
DEPRECATED RDW RBC AUTO: 57.7 FL (ref 35.1–46.3)
EOSINOPHIL # BLD AUTO: 0.03 K/MM3 (ref 0–0.68)
EOSINOPHIL NFR BLD AUTO: 1 % (ref 0–6)
ERYTHROCYTE [DISTWIDTH] IN BLOOD BY AUTOMATED COUNT: 18.1 % (ref 11.7–14.2)
GLOBULIN SER CALC-MCNC: 3.7 G/DL (ref 2.2–4)
GLUCOSE SERPL-MCNC: 135 MG/DL (ref 70–99)
HCT VFR BLD AUTO: 28.2 % (ref 37–53)
HGB BLD-MCNC: 8 G/DL (ref 13.5–17.5)
IMM GRANULOCYTES # BLD AUTO: 0.01 K/MM3 (ref 0–0.1)
IMM GRANULOCYTES NFR BLD AUTO: 0 % (ref 0–1)
LDH SERPL-CCNC: 143 U/L (ref 100–240)
LYMPHOCYTES # BLD AUTO: 0.81 K/MM3 (ref 0.84–5.2)
LYMPHOCYTES NFR BLD AUTO: 20 % (ref 21–46)
MCHC RBC AUTO-ENTMCNC: 28.4 G/DL (ref 31.5–36.5)
MCV RBC AUTO: 87 FL (ref 80–100)
MONOCYTES # BLD AUTO: 0.32 K/MM3 (ref 0.16–1.47)
MONOCYTES NFR BLD AUTO: 8 % (ref 4–13)
NEUTROPHILS # BLD AUTO: 2.82 K/MM3 (ref 1.96–9.15)
NEUTROPHILS NFR BLD AUTO: 70 % (ref 41–73)
NRBC # BLD AUTO: 0 K/MM3 (ref 0–0.02)
NRBC BLD AUTO-RTO: 0 /100 WBC (ref 0–0.2)
PH BLDV: 7.31 [PH] (ref 7.34–7.37)
PLATELET # BLD AUTO: 123 K/MM3 (ref 150–400)
POTASSIUM SERPL-SCNC: 4.4 MMOL/L (ref 3.5–5.5)
PROT SERPL-MCNC: 5.4 G/DL (ref 6.4–8.2)
SODIUM SERPL-SCNC: 143 MMOL/L (ref 136–145)

## 2023-08-15 NOTE — NUR
THIS RN ASSUMED CARE AT 1200. PATIENT RESTING IN BED, DENIES NEEDS. PHYSICAL
THERAPY AND OCCUPATIONAL THERAPY IN TO ASSESS. WIFE CALLED AND UPDATED.
PATIENT ON 3-4L NASAL CANNULA. NOT URINATING, ONE LOOSE BOWEL MOVEMENTS.
BLADDER SCAN DONE SHOWING 507ML. ORDER FOR STRAIGHT CATH X1. VBG DRAWN AND
RESULTS CALLED INTO DR. FRYE, REQUEST FOR BIPAP FOR THE NEXT HOUR AND THEN
FOR PATIENT TO WEAR NIGHTLY AND FOR NAPS. RESPIRATORY IN ROOM AT THIS TIME
SETTING UP BIPAP. VITAL SIGNS STABLE. DENIES PAINS. Q2 TURNING AND AS NEEDED.
PARTIAL BED BATH COMPLETED. POWERGLIDE WNL. BOWEL TONES PRESENT. EATING AND
DRINKING VERY SMALL AMOUNTS. CALL LIGHT IN REACH. BED ALARM IN PLACE.

## 2023-08-15 NOTE — NUR
SHIFT SUMMARY
ADMITTED FOR ACUTE RESPIRATORY FAILURE. FULL CODE. CONTACT [facility] FOR
CDIFF. FOUND TO HAVE LARGE RIGHT PLEURAL EFFUSION. LASIX IS SCHEDULED. ELIQUIS
WAS HELD FOR PLANNED THORACENTESIS TODAY. 4 LPM O2 HERE, ON RA @ HOME. [facility]
LIFT TO CHAIR. FROM [facility], PREVIOUS ADMIT THERE FOR CHF
EXACERBATION. POWERGLIDE IN LEFT UPPER ARM. OLD LEFT BKA.

## 2023-08-15 NOTE — NUR
SHIFT SUMMARY:
 
NO ACUTE CHANGES. PATIENT REMAINS ON 3L NASAL CANNULA WHEN OFF BIPAP. BIPAP IN
PLACE AT THIS TIME. DENIES PAINS. VERY SOFT SPOKEN, ALERT TO SELF AND FAMILY.
Q2 TURNING AND AS NEEDED. URINATING IN URINAL. BOWEL MOVEMENT X1 TODAY. WIFE
UPDATED VIA PHONE CALL. DENIES NEEDS. CALL LIGHT IN REACH, BED ALARM IN PLACE.
VITAL SIGNS STABLE.

## 2023-08-16 VITALS — SYSTOLIC BLOOD PRESSURE: 112 MMHG | DIASTOLIC BLOOD PRESSURE: 55 MMHG

## 2023-08-16 VITALS — DIASTOLIC BLOOD PRESSURE: 59 MMHG | SYSTOLIC BLOOD PRESSURE: 101 MMHG

## 2023-08-16 VITALS — SYSTOLIC BLOOD PRESSURE: 100 MMHG | DIASTOLIC BLOOD PRESSURE: 54 MMHG

## 2023-08-16 VITALS — SYSTOLIC BLOOD PRESSURE: 93 MMHG | DIASTOLIC BLOOD PRESSURE: 56 MMHG

## 2023-08-16 LAB
ALBUMIN SERPL BCP-MCNC: 1.7 G/DL (ref 3.4–5)
ALBUMIN/GLOB SERPL: 0.5 {RATIO} (ref 0.8–1.8)
ALT SERPL W P-5'-P-CCNC: 11 U/L (ref 12–78)
ANION GAP SERPL CALCULATED.4IONS-SCNC: 3 MMOL/L (ref 6–16)
AST SERPL W P-5'-P-CCNC: 12 U/L (ref 12–37)
BASOPHILS # BLD AUTO: 0.02 K/MM3 (ref 0–0.23)
BASOPHILS NFR BLD AUTO: 1 % (ref 0–2)
BILIRUB SERPL-MCNC: 0.3 MG/DL (ref 0.1–1)
BUN SERPL-MCNC: 24 MG/DL (ref 8–24)
CALCIUM SERPL-MCNC: 8.2 MG/DL (ref 8.5–10.1)
CHLORIDE SERPL-SCNC: 103 MMOL/L (ref 98–108)
CO2 SERPL-SCNC: 36 MMOL/L (ref 21–32)
CREAT SERPL-MCNC: 1.1 MG/DL (ref 0.6–1.2)
DEPRECATED RDW RBC AUTO: 56.6 FL (ref 35.1–46.3)
EOSINOPHIL # BLD AUTO: 0.04 K/MM3 (ref 0–0.68)
EOSINOPHIL NFR BLD AUTO: 1 % (ref 0–6)
ERYTHROCYTE [DISTWIDTH] IN BLOOD BY AUTOMATED COUNT: 18.1 % (ref 11.7–14.2)
GLOBULIN SER CALC-MCNC: 3.7 G/DL (ref 2.2–4)
GLUCOSE SERPL-MCNC: 121 MG/DL (ref 70–99)
HCT VFR BLD AUTO: 25.7 % (ref 37–53)
HGB BLD-MCNC: 7.4 G/DL (ref 13.5–17.5)
IMM GRANULOCYTES # BLD AUTO: 0.02 K/MM3 (ref 0–0.1)
IMM GRANULOCYTES NFR BLD AUTO: 1 % (ref 0–1)
LYMPHOCYTES # BLD AUTO: 0.83 K/MM3 (ref 0.84–5.2)
LYMPHOCYTES NFR BLD AUTO: 22 % (ref 21–46)
MCHC RBC AUTO-ENTMCNC: 28.8 G/DL (ref 31.5–36.5)
MCV RBC AUTO: 85 FL (ref 80–100)
MONOCYTES # BLD AUTO: 0.37 K/MM3 (ref 0.16–1.47)
MONOCYTES NFR BLD AUTO: 10 % (ref 4–13)
NEUTROPHILS # BLD AUTO: 2.42 K/MM3 (ref 1.96–9.15)
NEUTROPHILS NFR BLD AUTO: 66 % (ref 41–73)
NRBC # BLD AUTO: 0 K/MM3 (ref 0–0.02)
NRBC BLD AUTO-RTO: 0 /100 WBC (ref 0–0.2)
PH BLDV: 7.33 [PH] (ref 7.34–7.37)
PLATELET # BLD AUTO: 123 K/MM3 (ref 150–400)
POTASSIUM SERPL-SCNC: 4.3 MMOL/L (ref 3.5–5.5)
PROT SERPL-MCNC: 5.4 G/DL (ref 6.4–8.2)
SODIUM SERPL-SCNC: 142 MMOL/L (ref 136–145)

## 2023-08-16 NOTE — NUR
SHIFT SUMMARY - PT DIDN'T TOLERATE THE BIPAP MASK - ATTEMPTED X2 LAST NOC - PT
WORE FOR APPX 5 MINUTES EACH TIME, AND THEN REFUSED TO PLACE BACK ON.  OXYGEN
WEANED DOWN TO 2L AT 0500, AS PT'S SATS HAVE BEEN WNL ON CONTINUOUS BIOX.  PT
REMAINED ALERT TO PERSON.  FLUIDS AT BEDSIDE.  CALL LIGHT WITHIN REACH.  BED
IN LOW POSITION.  BED ALARM ON FOR PT SAFETY.  WILL CONTINUE TO MONITOR UNTIL
AM SHIFT CHANGE.

## 2023-08-16 NOTE — NUR
WE WILL NEED A CONSENT FOR THORACENTESIS FROM PT'S WIFE TODAY - WILL REPORT
THIS OFF TO ONCOMING SHIFT.

## 2023-08-16 NOTE — NUR
SHIFT SUMMARY:
 
RYAN IS A&OX1. VSS, BP ON THE LOW SIDE, CONSISTENT WITH TREND. PT'S WIFE AT
BEDSIDE TODAY AND REQUESTED TO SPEAK WITH PALLIATIVE CARE REGARDING HOSPICE.
PT IS TOLERATING PO INTAKE WELL, APPETITE POOR, REPORTS FEELING TIRED. HE
STATED THAT HE IS 57-YEARS-OLD AND FEELS UPSET THAT HE HAD TO HAVE HIS LEG
AMPUTATED SO YOUNG. PT FORGETS TO USE HIS CALL LIGHT AND CALLS OUT FOR
ASSISTANCE. ATTENDS IN PLACE, PT WAS ABLE TO USE THE URINAL  ML OUTPUT
THIS AFTERNOON AND HAS HAD TWO BOWEL MOVEMENTS THIS SHIFT. PT REQUIRES A LIFT
TO TRANSFER FROM BED TO CHAIR. HE IS LYING IN BED WITH THE CALL LIGHT IN
REACH. 2-3 LPM VIA NASAL CANNULA TO MAINTAIN O2 SATS >90%, SOB ON EXERTION
NOTED. CONTINUOUS PULSE OX IN PLACE. WCTM UNTIL REPORT IS GIVEN TO NIGHT SHIFT
RN.

## 2023-08-16 NOTE — NUR
Pt has decided he no longer wants to attempt rehab, he states he wants to
return home. His wife Cynthia is agreeable to this. Unfortunately, there is no
Home Health available in [place] for 2 to 3 weeks. I discussed this with pt
and his wife, but they are adamant about going home, and plan to just "wing
it" until Home Health is available. We did discuss hospice, but Cynthia states
she would prefer to "ease into it", wants to start with Home Health, and when
the HH staff state it's time, they will transition him to hospice.
  Spoke to Care Manager staff regarding this, and they are sending the HH
referral.

## 2023-08-17 VITALS — DIASTOLIC BLOOD PRESSURE: 45 MMHG | SYSTOLIC BLOOD PRESSURE: 103 MMHG

## 2023-08-17 VITALS — SYSTOLIC BLOOD PRESSURE: 91 MMHG | DIASTOLIC BLOOD PRESSURE: 60 MMHG

## 2023-08-17 VITALS — DIASTOLIC BLOOD PRESSURE: 43 MMHG | SYSTOLIC BLOOD PRESSURE: 95 MMHG

## 2023-08-17 VITALS — SYSTOLIC BLOOD PRESSURE: 106 MMHG | DIASTOLIC BLOOD PRESSURE: 56 MMHG

## 2023-08-17 VITALS — DIASTOLIC BLOOD PRESSURE: 56 MMHG | SYSTOLIC BLOOD PRESSURE: 99 MMHG

## 2023-08-17 VITALS — DIASTOLIC BLOOD PRESSURE: 42 MMHG | SYSTOLIC BLOOD PRESSURE: 98 MMHG

## 2023-08-17 LAB
ALBUMIN SERPL BCP-MCNC: 1.8 G/DL (ref 3.4–5)
ALBUMIN/GLOB SERPL: 0.5 {RATIO} (ref 0.8–1.8)
ALT SERPL W P-5'-P-CCNC: 9 U/L (ref 12–78)
ANION GAP SERPL CALCULATED.4IONS-SCNC: 0 MMOL/L (ref 6–16)
AST SERPL W P-5'-P-CCNC: 12 U/L (ref 12–37)
BASOPHILS # BLD AUTO: 0.01 K/MM3 (ref 0–0.23)
BASOPHILS # BLD: 0 K/MM3 (ref 0–0.23)
BASOPHILS NFR BLD AUTO: 0 % (ref 0–2)
BASOPHILS NFR BLD: 0 % (ref 0–2)
BILIRUB SERPL-MCNC: 0.4 MG/DL (ref 0.1–1)
BUN SERPL-MCNC: 21 MG/DL (ref 8–24)
CALCIUM SERPL-MCNC: 8.4 MG/DL (ref 8.5–10.1)
CHLORIDE SERPL-SCNC: 101 MMOL/L (ref 98–108)
CO2 SERPL-SCNC: 39 MMOL/L (ref 21–32)
CREAT SERPL-MCNC: 1.05 MG/DL (ref 0.6–1.2)
DEPRECATED RDW RBC AUTO: 56.9 FL (ref 35.1–46.3)
EOSINOPHIL # BLD AUTO: 0.06 K/MM3 (ref 0–0.68)
EOSINOPHIL # BLD: 0 K/MM3 (ref 0–0.68)
EOSINOPHIL NFR BLD AUTO: 2 % (ref 0–6)
EOSINOPHIL NFR BLD: 0 % (ref 0–6)
ERYTHROCYTE [DISTWIDTH] IN BLOOD BY AUTOMATED COUNT: 18.4 % (ref 11.7–14.2)
GLOBULIN SER CALC-MCNC: 3.8 G/DL (ref 2.2–4)
GLUCOSE FLD-MCNC: 118 MG/DL
GLUCOSE SERPL-MCNC: 122 MG/DL (ref 70–99)
HCT VFR BLD AUTO: 26 % (ref 37–53)
HGB BLD-MCNC: 7.5 G/DL (ref 13.5–17.5)
IMM GRANULOCYTES # BLD AUTO: 0.01 K/MM3 (ref 0–0.1)
IMM GRANULOCYTES NFR BLD AUTO: 0 % (ref 0–1)
LDH SPEC-CCNC: 128 U/L
LYMPHOCYTES # BLD AUTO: 1.05 K/MM3 (ref 0.84–5.2)
LYMPHOCYTES # BLD: 0.86 K/MM3 (ref 0.84–5.2)
LYMPHOCYTES NFR BLD AUTO: 28 % (ref 21–46)
LYMPHOCYTES NFR BLD: 23 % (ref 21–46)
LYMPHOCYTES NFR FLD MANUAL: 65 % (ref 0–18)
MCHC RBC AUTO-ENTMCNC: 28.8 G/DL (ref 31.5–36.5)
MCV RBC AUTO: 85 FL (ref 80–100)
MONOCYTES # BLD AUTO: 0.42 K/MM3 (ref 0.16–1.47)
MONOCYTES # BLD: 0.11 K/MM3 (ref 0.16–1.47)
MONOCYTES NFR BLD AUTO: 11 % (ref 4–13)
MONOCYTES NFR BLD: 3 % (ref 4–13)
MONONUC CELLS NFR FLD MANUAL: 8 % (ref 0–50)
NEUTROPHILS # BLD AUTO: 2.21 K/MM3 (ref 1.96–9.15)
NEUTROPHILS NFR BLD AUTO: 59 % (ref 41–73)
NEUTS SEG # BLD MANUAL: 2.78 K/MM3 (ref 1.96–9.15)
NEUTS SEG NFR BLD MANUAL: 74 % (ref 41–73)
NEUTS SEG NFR FLD MANUAL: 25 % (ref 0–25)
NRBC # BLD AUTO: 0 K/MM3 (ref 0–0.02)
NRBC BLD AUTO-RTO: 0 /100 WBC (ref 0–0.2)
PLATELET # BLD AUTO: 126 K/MM3 (ref 150–400)
POTASSIUM SERPL-SCNC: 4.1 MMOL/L (ref 3.5–5.5)
PROT SERPL-MCNC: 5.6 G/DL (ref 6.4–8.2)
RBC # FLD MANUAL: 0 M/MM3 (ref 0–0)
RBC # FLD MANUAL: 2000 /MM3 (ref 0–0)
SODIUM SERPL-SCNC: 140 MMOL/L (ref 136–145)
TOTAL CELLS COUNTED BLD: 31
TOTAL CELLS COUNTED SPEC: 100
WBC # FLD AUTO: 0.43 K/MM3 (ref 0–999)
WBC OTHER NFR FLD MANUAL: 2 % (ref 0–0)

## 2023-08-17 PROCEDURE — 0W993ZZ DRAINAGE OF RIGHT PLEURAL CAVITY, PERCUTANEOUS APPROACH: ICD-10-PCS

## 2023-08-17 NOTE — NUR
SHIFT SUMMARY
PATIENT ALERT, PLEASANT, COOPERATIVE. DENIES PAIN NOR DISCOFORT. NO ACUTE
CHANGES NOTED OVERNIGHT. PG TO LEFT UPPER ARM, PATENT, SL. REFUSES BIPAP, SPO2
>90 ON 2L VIA NC. BED IN LOW POSITION, CALL LIGHT WITHIN REACH.

## 2023-08-17 NOTE — NUR
SHIFT SUMMARY
Pt remains verbally aroused this shift. Pt will answer yes/no questions,
falling back to sleep. Denies pain, VSS. Repositioned for comfort. Incontinent
of bladder and bowel. BM today. Hygiene care provided. Safety maintained, call
lifht in reach. Will continue to monitor this shift.

## 2023-08-18 VITALS — DIASTOLIC BLOOD PRESSURE: 48 MMHG | SYSTOLIC BLOOD PRESSURE: 104 MMHG

## 2023-08-18 VITALS — SYSTOLIC BLOOD PRESSURE: 90 MMHG | DIASTOLIC BLOOD PRESSURE: 61 MMHG

## 2023-08-18 VITALS — SYSTOLIC BLOOD PRESSURE: 101 MMHG | DIASTOLIC BLOOD PRESSURE: 49 MMHG

## 2023-08-18 VITALS — DIASTOLIC BLOOD PRESSURE: 65 MMHG | SYSTOLIC BLOOD PRESSURE: 115 MMHG

## 2023-08-18 LAB
ALBUMIN SERPL BCP-MCNC: 1.7 G/DL (ref 3.4–5)
ALBUMIN/GLOB SERPL: 0.5 {RATIO} (ref 0.8–1.8)
ALT SERPL W P-5'-P-CCNC: 9 U/L (ref 12–78)
ANION GAP SERPL CALCULATED.4IONS-SCNC: 2 MMOL/L (ref 6–16)
AST SERPL W P-5'-P-CCNC: 13 U/L (ref 12–37)
BASOPHILS # BLD AUTO: 0.01 K/MM3 (ref 0–0.23)
BASOPHILS NFR BLD AUTO: 0 % (ref 0–2)
BILIRUB SERPL-MCNC: 0.4 MG/DL (ref 0.1–1)
BUN SERPL-MCNC: 20 MG/DL (ref 8–24)
CALCIUM SERPL-MCNC: 8.3 MG/DL (ref 8.5–10.1)
CHLORIDE SERPL-SCNC: 100 MMOL/L (ref 98–108)
CO2 SERPL-SCNC: 41 MMOL/L (ref 21–32)
CREAT SERPL-MCNC: 1.1 MG/DL (ref 0.6–1.2)
DEPRECATED RDW RBC AUTO: 55.9 FL (ref 35.1–46.3)
EOSINOPHIL # BLD AUTO: 0.05 K/MM3 (ref 0–0.68)
EOSINOPHIL NFR BLD AUTO: 2 % (ref 0–6)
ERYTHROCYTE [DISTWIDTH] IN BLOOD BY AUTOMATED COUNT: 18.3 % (ref 11.7–14.2)
GLOBULIN SER CALC-MCNC: 3.6 G/DL (ref 2.2–4)
GLUCOSE SERPL-MCNC: 117 MG/DL (ref 70–99)
HCT VFR BLD AUTO: 25.5 % (ref 37–53)
HGB BLD-MCNC: 7.4 G/DL (ref 13.5–17.5)
IMM GRANULOCYTES # BLD AUTO: 0.01 K/MM3 (ref 0–0.1)
IMM GRANULOCYTES NFR BLD AUTO: 0 % (ref 0–1)
LYMPHOCYTES # BLD AUTO: 0.75 K/MM3 (ref 0.84–5.2)
LYMPHOCYTES NFR BLD AUTO: 23 % (ref 21–46)
MCHC RBC AUTO-ENTMCNC: 29 G/DL (ref 31.5–36.5)
MCV RBC AUTO: 84 FL (ref 80–100)
MONOCYTES # BLD AUTO: 0.29 K/MM3 (ref 0.16–1.47)
MONOCYTES NFR BLD AUTO: 9 % (ref 4–13)
NEUTROPHILS # BLD AUTO: 2.09 K/MM3 (ref 1.96–9.15)
NEUTROPHILS NFR BLD AUTO: 65 % (ref 41–73)
NRBC # BLD AUTO: 0 K/MM3 (ref 0–0.02)
NRBC BLD AUTO-RTO: 0 /100 WBC (ref 0–0.2)
PLATELET # BLD AUTO: 133 K/MM3 (ref 150–400)
POTASSIUM SERPL-SCNC: 3.6 MMOL/L (ref 3.5–5.5)
PROT SERPL-MCNC: 5.3 G/DL (ref 6.4–8.2)
SODIUM SERPL-SCNC: 143 MMOL/L (ref 136–145)

## 2023-08-18 NOTE — NUR
PATIENT HAD DIFFICULT TIME SLEEPING OVERNIGHT DUE TO STATE OF DISCOMFORT
WEARING EITHER 02 OR BIPAP.  HUMIDITY ADDED TO O2 (2.5L), AND OXYMETRY
ATTACHED TO TOE INSTEAD OF FINGERS.  NO COMPLAINTS OR INDICATIONS OF PAIN OR
DISCOMFORT OVERNIGHT.  MR PEARSON HAD A VERY FLAT AFFECT AND APPEARED TO BE
QUITE SAD.

## 2023-08-18 NOTE — NUR
SHIFT SUMMARY
Pt remains A&OX3 this shift. Denies pain, VSS. 1:1 feed for meals, decreased
appetite. Incontinent of B&B. Hygiene provided. Able to use urinal when
prompted. Repostioned for comfort. Pt verbalizes that he feels like his time
on earth is nearing an end. In further conversation, pt does not want
CPR/intubation if his heart stopped. Stating further he doesnt want his wife
to know about change in code status. Dr. Fine updated and states will meet
with pt at bedside.

## 2023-08-18 NOTE — NUR
pt wanted to change his code status. Review of options and pt opted for dnr
limited treatment. He did not want me to tell his wife because it would make
her sad. Advised him I could not send him home and not tell her. Pt expressing
feeling hopless and just wanted to get it overwith.
   therputic conversation with pt about his fears and his xiang in god. Called
his wife and had a very good discussion about his choice and reviewed ways to
give him dignity and comfort. She stated she is now starting to face where
they are at and accempted DNR.
   Pt stated he wanted a beer. Called doctor and got dnr order and a one time
order for beer. Becaue of his age and condition we will only give him a few
sips. Hope is discharge tomorrow. Will contact the HH team and advise may need
a hopice consult also.

## 2023-08-19 VITALS — DIASTOLIC BLOOD PRESSURE: 59 MMHG | SYSTOLIC BLOOD PRESSURE: 101 MMHG

## 2023-08-19 VITALS — DIASTOLIC BLOOD PRESSURE: 53 MMHG | SYSTOLIC BLOOD PRESSURE: 105 MMHG

## 2023-08-19 VITALS — SYSTOLIC BLOOD PRESSURE: 102 MMHG | DIASTOLIC BLOOD PRESSURE: 52 MMHG

## 2023-08-19 VITALS — DIASTOLIC BLOOD PRESSURE: 54 MMHG | SYSTOLIC BLOOD PRESSURE: 115 MMHG

## 2023-08-19 LAB
ALBUMIN SERPL BCP-MCNC: 1.7 G/DL (ref 3.4–5)
ALBUMIN/GLOB SERPL: 0.4 {RATIO} (ref 0.8–1.8)
ALT SERPL W P-5'-P-CCNC: 8 U/L (ref 12–78)
ANION GAP SERPL CALCULATED.4IONS-SCNC: 3 MMOL/L (ref 6–16)
AST SERPL W P-5'-P-CCNC: 12 U/L (ref 12–37)
BASOPHILS # BLD AUTO: 0.02 K/MM3 (ref 0–0.23)
BASOPHILS NFR BLD AUTO: 1 % (ref 0–2)
BILIRUB SERPL-MCNC: 0.4 MG/DL (ref 0.1–1)
BUN SERPL-MCNC: 21 MG/DL (ref 8–24)
CALCIUM SERPL-MCNC: 8.3 MG/DL (ref 8.5–10.1)
CHLORIDE SERPL-SCNC: 100 MMOL/L (ref 98–108)
CO2 SERPL-SCNC: 39 MMOL/L (ref 21–32)
CREAT SERPL-MCNC: 1.05 MG/DL (ref 0.6–1.2)
DEPRECATED RDW RBC AUTO: 56.6 FL (ref 35.1–46.3)
EOSINOPHIL # BLD AUTO: 0.07 K/MM3 (ref 0–0.68)
EOSINOPHIL NFR BLD AUTO: 2 % (ref 0–6)
ERYTHROCYTE [DISTWIDTH] IN BLOOD BY AUTOMATED COUNT: 18.6 % (ref 11.7–14.2)
GLOBULIN SER CALC-MCNC: 3.8 G/DL (ref 2.2–4)
GLUCOSE SERPL-MCNC: 117 MG/DL (ref 70–99)
HCT VFR BLD AUTO: 25.5 % (ref 37–53)
HGB BLD-MCNC: 7.4 G/DL (ref 13.5–17.5)
IMM GRANULOCYTES # BLD AUTO: 0.01 K/MM3 (ref 0–0.1)
IMM GRANULOCYTES NFR BLD AUTO: 0 % (ref 0–1)
LYMPHOCYTES # BLD AUTO: 1.16 K/MM3 (ref 0.84–5.2)
LYMPHOCYTES NFR BLD AUTO: 31 % (ref 21–46)
MCHC RBC AUTO-ENTMCNC: 29 G/DL (ref 31.5–36.5)
MCV RBC AUTO: 84 FL (ref 80–100)
MONOCYTES # BLD AUTO: 0.37 K/MM3 (ref 0.16–1.47)
MONOCYTES NFR BLD AUTO: 10 % (ref 4–13)
NEUTROPHILS # BLD AUTO: 2.08 K/MM3 (ref 1.96–9.15)
NEUTROPHILS NFR BLD AUTO: 56 % (ref 41–73)
NRBC # BLD AUTO: 0 K/MM3 (ref 0–0.02)
NRBC BLD AUTO-RTO: 0 /100 WBC (ref 0–0.2)
PLATELET # BLD AUTO: 138 K/MM3 (ref 150–400)
POTASSIUM SERPL-SCNC: 3.6 MMOL/L (ref 3.5–5.5)
PROT SERPL-MCNC: 5.5 G/DL (ref 6.4–8.2)
SODIUM SERPL-SCNC: 142 MMOL/L (ref 136–145)

## 2023-08-19 NOTE — NUR
RN WITH MANY CONSULTATIONS WITH CARE MANAGER TO COORDINATE DISCHARGE PROCESS.
THIS RN CALLED PT'S WIFE COTY, TO VERBALLY REVIEW DISCHARGE EDUCATION AND
INSTRUCTION. COVERED DISCHARGE MEDS, FOLLOW UP WITH PCP TO SCHEDULE OUTPT
THORACENTESIS, CALL NUMBERS FOR [facility]. PT'S PORTABLE OXYGEN MACHINE
WAS DELIVERED TODAY BY IVAN, PT SIGNED FOR IT. PT'S WIFE IS TO CALL [facility] [phone] ONCE [facility] TRANSPORTS PT TO HOME.
COTY'S NUMBER [id number],

## 2023-08-19 NOTE — NUR
SLEEPING WELL. FAR LESS 02 ALARMS TONIGHT. PATIENT HAS NO COMPLAINTS OR
INDICAATIONS OF PAIN OR DISCOMFORT.  ALL SMILES  ABOUT GOING  HOME
TOMORROW.'WILL CONTINIUE TO OMONITOR

## 2023-08-19 NOTE — NUR
this rn called [facility] in [place] and left a VM with the Rx for
discharge medication of Metoprolol 25mg XR, take 1 tab BID, disp 30 tablets.
Hold for SBP <110 and HR <60.

## 2023-08-19 NOTE — NUR
CONSULTATIONS WITH CARE MANAGEMENT AND RESP THERAPY. PER HOME O2 EVAL, PT
DOESN'T NEED OXYGEN TO MAINTAIN SAT. AFTER RESP THERAPY RECOMMENDATION, RN
REMOVED OXYGEN FOR TRIAL OF COONTINUOUS PULSE OX. HE WAS ABLE TO MAINTAIN 95%
OXYGEN ON ROOM AIR, WHILE LYING DOWN. CARE MANAGER TO [employer] transportation
home to [place]. RN notified CHARGE NURSE.

## 2023-08-19 NOTE — NUR
PT EXPERIENCED AN EPISODE OF DESATTING DOWN TO 87/88%. RESP THERAPY PERFORMED
A SECOND HOME OXYGEN EVAL, AND RECOMMENDED 1LPM VIA NC FOR ACTIVITY AND REST.
RN UPDATED CARE MANAGEMENT.

## 2023-08-19 NOTE — NUR
PT'S IV REMOVED FROM [facility] WITHOUT INCIDENT.PT IS MAINTAINING SATS ABOVE 92% ON
1LPM. HE IS A&O X3-4, OCCASIONALLY FORGETFUL OF HIS LIMITATIONS. PT USES
URINAL WITH ASSISTANCE FROM STAFF. BELONGINGS PLACED IN PT BELONGINGS BAG, TO
BE SENT WITH [facility] TRANSPORT WHEN THEY ARRIVE. DISCHARGE PAPERWORK
PLACED IN FOLDER IN PT BELONGINGS BAG. RN WILL CONTINUE TO MONITOR.